# Patient Record
Sex: MALE | Race: BLACK OR AFRICAN AMERICAN | NOT HISPANIC OR LATINO | URBAN - METROPOLITAN AREA
[De-identification: names, ages, dates, MRNs, and addresses within clinical notes are randomized per-mention and may not be internally consistent; named-entity substitution may affect disease eponyms.]

---

## 2022-07-28 VITALS
DIASTOLIC BLOOD PRESSURE: 84 MMHG | WEIGHT: 194.01 LBS | HEART RATE: 59 BPM | SYSTOLIC BLOOD PRESSURE: 154 MMHG | HEIGHT: 73 IN | OXYGEN SATURATION: 99 % | RESPIRATION RATE: 18 BRPM | TEMPERATURE: 98 F

## 2022-07-28 RX ORDER — CHLORHEXIDINE GLUCONATE 213 G/1000ML
1 SOLUTION TOPICAL ONCE
Refills: 0 | Status: DISCONTINUED | OUTPATIENT
Start: 2022-08-01 | End: 2022-08-16

## 2022-07-28 NOTE — H&P ADULT - ASSESSMENT
57 y/o male w/ FHx CAD (mother) and PMHx new onset cardiomyopathy (see below) who presents for cardiac catheterization secondary to CCS II anginaln equivalent symptoms in the setting of newly reduced EF, and abnormal stress test.     ASA III Mallampati III  Precath consented  Started IVF NS   Loaded with ---    Patient is a candidate for moderate sedation.     Risks & benefits of procedure and alternative therapy have been explained to the patient including but not limited to: allergic reaction, bleeding w/possible need for blood transfusion, infection, renal and vascular compromise, limb damage, arrhythmia, stroke, vessel dissection/perforation, Myocardial infarction, emergent CABG. Informed consent obtained and in chart.  55 y/o male w/ FHx CAD (mother) and PMHx new onset cardiomyopathy (see below) who presents for cardiac catheterization secondary to CCS II anginaln equivalent symptoms in the setting of newly reduced EF, and abnormal stress test.     ASA III Mallampati III  Precath consented  Started IVF NS @ 250cc x1, followed by 50cc/h x2 hrs  Loaded with Plavix 600mg POx1 and ASA 325mg POx1    Patient is a candidate for moderate sedation.     Risks & benefits of procedure and alternative therapy have been explained to the patient including but not limited to: allergic reaction, bleeding w/possible need for blood transfusion, infection, renal and vascular compromise, limb damage, arrhythmia, stroke, vessel dissection/perforation, Myocardial infarction, emergent CABG. Informed consent obtained and in chart.

## 2022-07-28 NOTE — H&P ADULT - HISTORY OF PRESENT ILLNESS
COVID: ____________  Cardiologist: Dr. Betsy Casey   Pharmacy: ____________  Escort: ___________    55 y/o male w/ FHx CAD (mother) and PMHx new onset cardiomyopathy (see below) who presented to outpatient cardiologist, Dr. Betsy Casey, endorsing SOB w/ ambulation of long distances as well as mild chest discomfort. Patient denies any syncope, dizziness, diaphoresis, palpitations, abdominal pain, nausea/vomiting, melena, LE edema, fever, chills, and cough. Echocardiogram (03/24/2022) showed dilated LV cavity and dilated cardiomyopathy w/ EF 35-40% and normal global wall motion, grade II diastolic dysfunction, mildly dilated LA, moderate MR, and mild TR. Stress Echocardiogram (06/14/2022) revealed resting EF 35-40%, stress EF 40-45%, and stress findings of akinesis in the apical septal wall, mid anteroseptal wall, mid inferoseptal wall, basal anteroseptal wall, and basal inferoseptal wall.     In light of patient's risk factors, CCS Class II anginal/anginal equivalent symptoms, newly reduced EF and cardiomyopathy on Echocardiogram, and abnormal Stress Echocardiogram results, patient now presents for cardiac catheterization w/ possible intervention if clinically indicated.  COVID: ____________  Cardiologist: Dr. Betsy Casey   Pharmacy: [facility] (in [place])  Escort: ___________    55 y/o male w/ FHx CAD (mother) and PMHx new onset cardiomyopathy (see below) who presented to outpatient cardiologist, Dr. Betsy Casey, endorsing SOB w/ ambulation of long distances as well as mild chest discomfort. Patient denies any syncope, dizziness, diaphoresis, palpitations, abdominal pain, nausea/vomiting, melena, LE edema, fever, chills, and cough. Echocardiogram (03/24/2022) showed dilated LV cavity and dilated cardiomyopathy w/ EF 35-40% and normal global wall motion, grade II diastolic dysfunction, mildly dilated LA, moderate MR, and mild TR. Stress Echocardiogram (06/14/2022) revealed resting EF 35-40%, stress EF 40-45%, and stress findings of akinesis in the apical septal wall, mid anteroseptal wall, mid inferoseptal wall, basal anteroseptal wall, and basal inferoseptal wall.     In light of patient's risk factors, CCS Class II anginal/anginal equivalent symptoms, newly reduced EF and cardiomyopathy on Echocardiogram, and abnormal Stress Echocardiogram results, patient now presents for cardiac catheterization w/ possible intervention if clinically indicated.  COVID: negative results in chart  Cardiologist: Dr. Betsy Casey   Pharmacy: [facility] (in [place])  Escort:     57 y/o male w/ FHx CAD (mother) and PMHx new onset cardiomyopathy (see below) who presented to outpatient cardiologist, Dr. Betsy Casey, endorsing SOB w/ ambulation of long distances as well as mild chest discomfort. Patient denies any syncope, dizziness, diaphoresis, palpitations, abdominal pain, nausea/vomiting, melena, LE edema, fever, chills, and cough. Echocardiogram (03/24/2022) showed dilated LV cavity and dilated cardiomyopathy w/ EF 35-40% and normal global wall motion, grade II diastolic dysfunction, mildly dilated LA, moderate MR, and mild TR. Stress Echocardiogram (06/14/2022) revealed resting EF 35-40%, stress EF 40-45%, and stress findings of akinesis in the apical septal wall, mid anteroseptal wall, mid inferoseptal wall, basal anteroseptal wall, and basal inferoseptal wall.     In light of patient's risk factors, CCS Class II anginal/anginal equivalent symptoms, newly reduced EF and cardiomyopathy on Echocardiogram, and abnormal Stress Echocardiogram results, patient now presents for cardiac catheterization w/ possible intervention if clinically indicated.  COVID: negative results in chart  Cardiologist: Dr. Betsy Casey   Pharmacy: [facility] (in [place])  Escort: friend     57 y/o male w/ FHx CAD (mother) and PMHx new onset cardiomyopathy (see below) who presented to outpatient cardiologist, Dr. Betsy Casey, endorsing SOB w/ ambulation of long distances as well as mild chest discomfort. Patient denies any syncope, dizziness, diaphoresis, palpitations, abdominal pain, nausea/vomiting, melena, LE edema, fever, chills, and cough. Echocardiogram (03/24/2022) showed dilated LV cavity and dilated cardiomyopathy w/ EF 35-40% and normal global wall motion, grade II diastolic dysfunction, mildly dilated LA, moderate MR, and mild TR. Stress Echocardiogram (06/14/2022) revealed resting EF 35-40%, stress EF 40-45%, and stress findings of akinesis in the apical septal wall, mid anteroseptal wall, mid inferoseptal wall, basal anteroseptal wall, and basal inferoseptal wall.     In light of patient's risk factors, CCS Class II anginal/anginal equivalent symptoms, newly reduced EF and cardiomyopathy on Echocardiogram, and abnormal Stress Echocardiogram results, patient now presents for cardiac catheterization w/ possible intervention if clinically indicated.

## 2022-07-28 NOTE — H&P ADULT - NSICDXFAMILYHX_GEN_ALL_CORE_FT
FAMILY HISTORY:  Mother  Still living? Unknown  FHx: coronary artery disease, Age at diagnosis: Age Unknown

## 2022-08-01 ENCOUNTER — OUTPATIENT (OUTPATIENT)
Dept: OUTPATIENT SERVICES | Facility: HOSPITAL | Age: 57
LOS: 1 days | Discharge: HOME | End: 2022-08-01
Payer: COMMERCIAL

## 2022-08-01 LAB
ALBUMIN SERPL ELPH-MCNC: 4.6 G/DL — SIGNIFICANT CHANGE UP (ref 3.3–5)
ALP SERPL-CCNC: 86 U/L — SIGNIFICANT CHANGE UP (ref 40–120)
ALT FLD-CCNC: 23 U/L — SIGNIFICANT CHANGE UP (ref 10–45)
ANION GAP SERPL CALC-SCNC: 9 MMOL/L — SIGNIFICANT CHANGE UP (ref 5–17)
APTT BLD: 36 SEC — HIGH (ref 27.5–35.5)
AST SERPL-CCNC: 21 U/L — SIGNIFICANT CHANGE UP (ref 10–40)
BASOPHILS # BLD AUTO: 0.06 K/UL — SIGNIFICANT CHANGE UP (ref 0–0.2)
BASOPHILS NFR BLD AUTO: 0.7 % — SIGNIFICANT CHANGE UP (ref 0–2)
BILIRUB SERPL-MCNC: 0.3 MG/DL — SIGNIFICANT CHANGE UP (ref 0.2–1.2)
BUN SERPL-MCNC: 13 MG/DL — SIGNIFICANT CHANGE UP (ref 7–23)
CALCIUM SERPL-MCNC: 9.3 MG/DL — SIGNIFICANT CHANGE UP (ref 8.4–10.5)
CHLORIDE SERPL-SCNC: 105 MMOL/L — SIGNIFICANT CHANGE UP (ref 96–108)
CHOLEST SERPL-MCNC: 196 MG/DL — SIGNIFICANT CHANGE UP
CK MB CFR SERPL CALC: 1.6 NG/ML — SIGNIFICANT CHANGE UP (ref 0–6.7)
CK SERPL-CCNC: 151 U/L — SIGNIFICANT CHANGE UP (ref 30–200)
CO2 SERPL-SCNC: 27 MMOL/L — SIGNIFICANT CHANGE UP (ref 22–31)
CREAT SERPL-MCNC: 0.88 MG/DL — SIGNIFICANT CHANGE UP (ref 0.5–1.3)
EGFR: 100 ML/MIN/1.73M2 — SIGNIFICANT CHANGE UP
EOSINOPHIL # BLD AUTO: 0.22 K/UL — SIGNIFICANT CHANGE UP (ref 0–0.5)
EOSINOPHIL NFR BLD AUTO: 2.7 % — SIGNIFICANT CHANGE UP (ref 0–6)
GLUCOSE SERPL-MCNC: 111 MG/DL — HIGH (ref 70–99)
HCT VFR BLD CALC: 42.3 % — SIGNIFICANT CHANGE UP (ref 39–50)
HCV AB S/CO SERPL IA: 0.03 S/CO — SIGNIFICANT CHANGE UP
HCV AB SERPL-IMP: SIGNIFICANT CHANGE UP
HDLC SERPL-MCNC: 56 MG/DL — SIGNIFICANT CHANGE UP
HGB BLD-MCNC: 13.3 G/DL — SIGNIFICANT CHANGE UP (ref 13–17)
IMM GRANULOCYTES NFR BLD AUTO: 0.4 % — SIGNIFICANT CHANGE UP (ref 0–1.5)
INR BLD: 0.99 — SIGNIFICANT CHANGE UP (ref 0.88–1.16)
LIPID PNL WITH DIRECT LDL SERPL: 115 MG/DL — HIGH
LYMPHOCYTES # BLD AUTO: 3.36 K/UL — HIGH (ref 1–3.3)
LYMPHOCYTES # BLD AUTO: 41.5 % — SIGNIFICANT CHANGE UP (ref 13–44)
MCHC RBC-ENTMCNC: 28.2 PG — SIGNIFICANT CHANGE UP (ref 27–34)
MCHC RBC-ENTMCNC: 31.4 GM/DL — LOW (ref 32–36)
MCV RBC AUTO: 89.8 FL — SIGNIFICANT CHANGE UP (ref 80–100)
MONOCYTES # BLD AUTO: 0.64 K/UL — SIGNIFICANT CHANGE UP (ref 0–0.9)
MONOCYTES NFR BLD AUTO: 7.9 % — SIGNIFICANT CHANGE UP (ref 2–14)
NEUTROPHILS # BLD AUTO: 3.79 K/UL — SIGNIFICANT CHANGE UP (ref 1.8–7.4)
NEUTROPHILS NFR BLD AUTO: 46.8 % — SIGNIFICANT CHANGE UP (ref 43–77)
NON HDL CHOLESTEROL: 140 MG/DL — HIGH
NRBC # BLD: 0 /100 WBCS — SIGNIFICANT CHANGE UP (ref 0–0)
PLATELET # BLD AUTO: 264 K/UL — SIGNIFICANT CHANGE UP (ref 150–400)
POTASSIUM SERPL-MCNC: 4.6 MMOL/L — SIGNIFICANT CHANGE UP (ref 3.5–5.3)
POTASSIUM SERPL-SCNC: 4.6 MMOL/L — SIGNIFICANT CHANGE UP (ref 3.5–5.3)
PROT SERPL-MCNC: 7.7 G/DL — SIGNIFICANT CHANGE UP (ref 6–8.3)
PROTHROM AB SERPL-ACNC: 11.8 SEC — SIGNIFICANT CHANGE UP (ref 10.5–13.4)
RBC # BLD: 4.71 M/UL — SIGNIFICANT CHANGE UP (ref 4.2–5.8)
RBC # FLD: 11.9 % — SIGNIFICANT CHANGE UP (ref 10.3–14.5)
SODIUM SERPL-SCNC: 141 MMOL/L — SIGNIFICANT CHANGE UP (ref 135–145)
TRIGL SERPL-MCNC: 126 MG/DL — SIGNIFICANT CHANGE UP
WBC # BLD: 8.1 K/UL — SIGNIFICANT CHANGE UP (ref 3.8–10.5)
WBC # FLD AUTO: 8.1 K/UL — SIGNIFICANT CHANGE UP (ref 3.8–10.5)

## 2022-08-01 PROCEDURE — 82553 CREATINE MB FRACTION: CPT

## 2022-08-01 PROCEDURE — 80061 LIPID PANEL: CPT

## 2022-08-01 PROCEDURE — 93460 R&L HRT ART/VENTRICLE ANGIO: CPT

## 2022-08-01 PROCEDURE — 99152 MOD SED SAME PHYS/QHP 5/>YRS: CPT

## 2022-08-01 PROCEDURE — C1894: CPT

## 2022-08-01 PROCEDURE — C1887: CPT

## 2022-08-01 PROCEDURE — C1769: CPT

## 2022-08-01 PROCEDURE — 80053 COMPREHEN METABOLIC PANEL: CPT

## 2022-08-01 PROCEDURE — 85730 THROMBOPLASTIN TIME PARTIAL: CPT

## 2022-08-01 PROCEDURE — 85025 COMPLETE CBC W/AUTO DIFF WBC: CPT

## 2022-08-01 PROCEDURE — 93005 ELECTROCARDIOGRAM TRACING: CPT

## 2022-08-01 PROCEDURE — 86803 HEPATITIS C AB TEST: CPT

## 2022-08-01 PROCEDURE — 82550 ASSAY OF CK (CPK): CPT

## 2022-08-01 PROCEDURE — 93010 ELECTROCARDIOGRAM REPORT: CPT

## 2022-08-01 PROCEDURE — 93460 R&L HRT ART/VENTRICLE ANGIO: CPT | Mod: 26

## 2022-08-01 PROCEDURE — 85610 PROTHROMBIN TIME: CPT

## 2022-08-01 RX ORDER — SACUBITRIL AND VALSARTAN 24; 26 MG/1; MG/1
1 TABLET, FILM COATED ORAL
Qty: 0 | Refills: 0 | DISCHARGE

## 2022-08-01 RX ORDER — SODIUM CHLORIDE 9 MG/ML
250 INJECTION INTRAMUSCULAR; INTRAVENOUS; SUBCUTANEOUS ONCE
Refills: 0 | Status: COMPLETED | OUTPATIENT
Start: 2022-08-01 | End: 2022-08-01

## 2022-08-01 RX ORDER — ASPIRIN/CALCIUM CARB/MAGNESIUM 324 MG
325 TABLET ORAL ONCE
Refills: 0 | Status: COMPLETED | OUTPATIENT
Start: 2022-08-01 | End: 2022-08-01

## 2022-08-01 RX ORDER — SODIUM CHLORIDE 9 MG/ML
500 INJECTION INTRAMUSCULAR; INTRAVENOUS; SUBCUTANEOUS
Refills: 0 | Status: DISCONTINUED | OUTPATIENT
Start: 2022-08-01 | End: 2022-08-16

## 2022-08-01 RX ORDER — ASPIRIN/CALCIUM CARB/MAGNESIUM 324 MG
1 TABLET ORAL
Qty: 30 | Refills: 3
Start: 2022-08-01 | End: 2022-11-28

## 2022-08-01 RX ORDER — METOPROLOL TARTRATE 50 MG
1 TABLET ORAL
Qty: 0 | Refills: 0 | DISCHARGE

## 2022-08-01 RX ORDER — CLOPIDOGREL BISULFATE 75 MG/1
600 TABLET, FILM COATED ORAL ONCE
Refills: 0 | Status: COMPLETED | OUTPATIENT
Start: 2022-08-01 | End: 2022-08-01

## 2022-08-01 RX ORDER — SODIUM CHLORIDE 9 MG/ML
500 INJECTION INTRAMUSCULAR; INTRAVENOUS; SUBCUTANEOUS
Refills: 0 | Status: DISCONTINUED | OUTPATIENT
Start: 2022-08-01 | End: 2022-08-01

## 2022-08-01 RX ADMIN — SODIUM CHLORIDE 250 MILLILITER(S): 9 INJECTION INTRAMUSCULAR; INTRAVENOUS; SUBCUTANEOUS at 15:23

## 2022-08-01 RX ADMIN — SODIUM CHLORIDE 50 MILLILITER(S): 9 INJECTION INTRAMUSCULAR; INTRAVENOUS; SUBCUTANEOUS at 15:23

## 2022-08-01 RX ADMIN — CLOPIDOGREL BISULFATE 600 MILLIGRAM(S): 75 TABLET, FILM COATED ORAL at 15:22

## 2022-08-01 RX ADMIN — SODIUM CHLORIDE 75 MILLILITER(S): 9 INJECTION INTRAMUSCULAR; INTRAVENOUS; SUBCUTANEOUS at 18:00

## 2022-08-01 RX ADMIN — Medication 325 MILLIGRAM(S): at 15:23

## 2022-08-01 NOTE — PROGRESS NOTE ADULT - SUBJECTIVE AND OBJECTIVE BOX
Interventional Cardiology PA SDA Discharge Note    Patient without complaints. Ambulated and voided without difficulties    Afebrile, VSS    Ext:    		Right brachial (5French), Right 7F CFV access and Right TR band access: no hematoma, no bleed, 2+ radial pulse.       A/P:    57 y/o male w/ FHx CAD (mother) and PMHx new onset cardiomyopathy (see below) who presented to outpatient cardiologist, Dr. Betsy Casey, endorsing SOB w/ ambulation of long distances as well as mild chest discomfort. Patient denies any syncope, dizziness, diaphoresis, palpitations, abdominal pain, nausea/vomiting, melena, LE edema, fever, chills, and cough. Echocardiogram (03/24/2022) showed dilated LV cavity and dilated cardiomyopathy w/ EF 35-40% and normal global wall motion, grade II diastolic dysfunction, mildly dilated LA, moderate MR, and mild TR. Stress Echocardiogram (06/14/2022) revealed resting EF 35-40%, stress EF 40-45%, and stress findings of akinesis in the apical septal wall, mid anteroseptal wall, mid inferoseptal wall, basal anteroseptal wall, and basal inferoseptal wall.     In light of patient's risk factors, CCS Class II anginal/anginal equivalent symptoms, newly reduced EF and cardiomyopathy on Echocardiogram, and abnormal Stress Echocardiogram results, patient presented for cardiac catheterization w/ possible intervention if clinically indicated today. Patient is now s/p Right and Left Heart Cath (8/1/2022) mild non obstructive CAD; slow flow. RHC                 1.	Stable for discharge as per attending Dr. Casey after bed rest, pt voids, groin/wrist stable and 30 minutes of ambulation.  2.	Follow-up with PMD/Cardiologist Delia 1-2 weeks  3.	Discharged forms signed and copies in chart    Interventional Cardiology PA SDA Discharge Note    Patient without complaints. Ambulated and voided without difficulties    Afebrile, VSS    Ext:    		Right brachial (5French), Right 7F CFV access and Right TR band access: no hematoma, no bleed, 2+ radial pulse.       A/P:    57 y/o male w/ FHx CAD (mother) and PMHx new onset cardiomyopathy (see below) who presented to outpatient cardiologist, Dr. Betsy Casey, endorsing SOB w/ ambulation of long distances as well as mild chest discomfort. Patient denies any syncope, dizziness, diaphoresis, palpitations, abdominal pain, nausea/vomiting, melena, LE edema, fever, chills, and cough. Echocardiogram (03/24/2022) showed dilated LV cavity and dilated cardiomyopathy w/ EF 35-40% and normal global wall motion, grade II diastolic dysfunction, mildly dilated LA, moderate MR, and mild TR. Stress Echocardiogram (06/14/2022) revealed resting EF 35-40%, stress EF 40-45%, and stress findings of akinesis in the apical septal wall, mid anteroseptal wall, mid inferoseptal wall, basal anteroseptal wall, and basal inferoseptal wall.     In light of patient's risk factors, CCS Class II anginal/anginal equivalent symptoms, newly reduced EF and cardiomyopathy on Echocardiogram, and abnormal Stress Echocardiogram results, patient presented for cardiac catheterization w/ possible intervention if clinically indicated today. Patient is now s/p Right and Left Heart Cath (8/1/2022) mild luminal irregularities and slow flow consistent with microvascular disease. RHC normal, BIV filling pressure with borderline cardiac output. To continue Entrestor/BB/ASA therapy. Script sent for ASA 81 mg daily. Patient to follow up with Dr. Paulino               1.	Stable for discharge as per attending Dr. Casey after bed rest, pt voids, groin/wrist stable and 30 minutes of ambulation.  2.	Follow-up with PMD/Cardiologist Delia 1-2 weeks  3.	Discharged forms signed and copies in chart    Interventional Cardiology PA SDA Discharge Note    Patient without complaints. Ambulated and voided without difficulties    Afebrile, VSS    Ext:    		Right brachial (5French), Right 7F CFV access and Right TR band access: no hematoma, no bleed, 2+ radial pulse.       A/P:    57 y/o male w/ FHx CAD (mother) and PMHx new onset cardiomyopathy (see below) who presented to outpatient cardiologist, Dr. Betsy Casey, endorsing SOB w/ ambulation of long distances as well as mild chest discomfort. Patient denies any syncope, dizziness, diaphoresis, palpitations, abdominal pain, nausea/vomiting, melena, LE edema, fever, chills, and cough. Echocardiogram (03/24/2022) showed dilated LV cavity and dilated cardiomyopathy w/ EF 35-40% and normal global wall motion, grade II diastolic dysfunction, mildly dilated LA, moderate MR, and mild TR. Stress Echocardiogram (06/14/2022) revealed resting EF 35-40%, stress EF 40-45%, and stress findings of akinesis in the apical septal wall, mid anteroseptal wall, mid inferoseptal wall, basal anteroseptal wall, and basal inferoseptal wall.     In light of patient's risk factors, CCS Class II anginal/anginal equivalent symptoms, newly reduced EF and cardiomyopathy on Echocardiogram, and abnormal Stress Echocardiogram results, patient presented for cardiac catheterization w/ possible intervention if clinically indicated today. Patient is now s/p Right and Left Heart Cath (8/1/2022) mild luminal irregularities and slow flow consistent with microvascular disease. RHC normal, BIV filling pressure with borderline cardiac output. To continue Entrestor/BB/ASA therapy. Script sent for ASA 81 mg daily. Patient to follow up with Dr. Paulino. Access sites: Right brachial, Right CFV and Right radial.     1.	Stable for discharge as per attending Dr. Casey after bed rest, pt voids, groin/wrist stable and 30 minutes of ambulation.  2.	Follow-up with PMD/Cardiologist Delia 1-2 weeks  3.	Discharged forms signed and copies in chart

## 2022-08-04 DIAGNOSIS — I50.9 HEART FAILURE, UNSPECIFIED: ICD-10-CM

## 2022-08-04 DIAGNOSIS — R94.39 ABNORMAL RESULT OF OTHER CARDIOVASCULAR FUNCTION STUDY: ICD-10-CM

## 2022-08-04 DIAGNOSIS — I25.110 ATHEROSCLEROTIC HEART DISEASE OF NATIVE CORONARY ARTERY WITH UNSTABLE ANGINA PECTORIS: ICD-10-CM

## 2024-09-18 NOTE — H&P ADULT - NSHPROSALLOTHERNEGRD_GEN_ALL_CORE
Podiatry -- [phone] Dr Chairez    [phone] for scheduling imaging   All other review of systems negative, except as noted in HPI No